# Patient Record
Sex: MALE | Race: WHITE | Employment: OTHER
[De-identification: names, ages, dates, MRNs, and addresses within clinical notes are randomized per-mention and may not be internally consistent; named-entity substitution may affect disease eponyms.]

---

## 2017-01-26 ENCOUNTER — OFFICE VISIT (OUTPATIENT)
Dept: PULMONOLOGY | Facility: CLINIC | Age: 62
End: 2017-01-26

## 2017-01-26 VITALS
OXYGEN SATURATION: 92 % | HEART RATE: 65 BPM | TEMPERATURE: 98 F | RESPIRATION RATE: 16 BRPM | DIASTOLIC BLOOD PRESSURE: 88 MMHG | SYSTOLIC BLOOD PRESSURE: 152 MMHG | HEIGHT: 70 IN | WEIGHT: 300 LBS | BODY MASS INDEX: 42.95 KG/M2

## 2017-01-26 DIAGNOSIS — E66.01 MORBID OBESITY WITH BMI OF 40.0-44.9, ADULT (HCC): ICD-10-CM

## 2017-01-26 DIAGNOSIS — G47.33 OSA ON CPAP: Primary | ICD-10-CM

## 2017-01-26 DIAGNOSIS — Z87.891 STOPPED SMOKING WITH GREATER THAN 40 PACK YEAR HISTORY: ICD-10-CM

## 2017-01-26 DIAGNOSIS — R53.81 PHYSICAL DECONDITIONING: ICD-10-CM

## 2017-01-26 DIAGNOSIS — Z99.89 OSA ON CPAP: Primary | ICD-10-CM

## 2017-01-26 DIAGNOSIS — J44.9 MODERATE COPD (CHRONIC OBSTRUCTIVE PULMONARY DISEASE) (HCC): ICD-10-CM

## 2017-01-26 PROCEDURE — 99213 OFFICE O/P EST LOW 20 MIN: CPT | Performed by: INTERNAL MEDICINE

## 2017-01-26 RX ORDER — ALBUTEROL SULFATE 90 UG/1
2 AEROSOL, METERED RESPIRATORY (INHALATION) EVERY 6 HOURS PRN
Qty: 1 INHALER | Refills: 11 | Status: SHIPPED | OUTPATIENT
Start: 2017-01-26 | End: 2020-08-24 | Stop reason: SDUPTHER

## 2017-01-26 ASSESSMENT — ENCOUNTER SYMPTOMS
SHORTNESS OF BREATH: 1
EYES NEGATIVE: 1
BACK PAIN: 1

## 2019-08-26 ENCOUNTER — OFFICE VISIT (OUTPATIENT)
Dept: PULMONOLOGY | Age: 64
End: 2019-08-26
Payer: MEDICARE

## 2019-08-26 VITALS
OXYGEN SATURATION: 95 % | HEART RATE: 66 BPM | DIASTOLIC BLOOD PRESSURE: 97 MMHG | BODY MASS INDEX: 45.1 KG/M2 | SYSTOLIC BLOOD PRESSURE: 164 MMHG | TEMPERATURE: 98.4 F | WEIGHT: 315 LBS | RESPIRATION RATE: 20 BRPM | HEIGHT: 70 IN

## 2019-08-26 DIAGNOSIS — G47.33 OSA ON CPAP: Primary | ICD-10-CM

## 2019-08-26 DIAGNOSIS — E66.01 MORBID OBESITY WITH BMI OF 40.0-44.9, ADULT (HCC): ICD-10-CM

## 2019-08-26 DIAGNOSIS — J44.9 MODERATE COPD (CHRONIC OBSTRUCTIVE PULMONARY DISEASE) (HCC): ICD-10-CM

## 2019-08-26 DIAGNOSIS — J44.9 COPD, MODERATE (HCC): ICD-10-CM

## 2019-08-26 DIAGNOSIS — I10 ESSENTIAL HYPERTENSION: ICD-10-CM

## 2019-08-26 DIAGNOSIS — Z99.89 OSA ON CPAP: Primary | ICD-10-CM

## 2019-08-26 DIAGNOSIS — J92.0 CALCIFIED PLEURAL PLAQUE DUE TO ASBESTOS EXPOSURE: ICD-10-CM

## 2019-08-26 DIAGNOSIS — J61 ASBESTOSIS (HCC): ICD-10-CM

## 2019-08-26 PROCEDURE — G8427 DOCREV CUR MEDS BY ELIG CLIN: HCPCS | Performed by: INTERNAL MEDICINE

## 2019-08-26 PROCEDURE — 3023F SPIROM DOC REV: CPT | Performed by: INTERNAL MEDICINE

## 2019-08-26 PROCEDURE — G8926 SPIRO NO PERF OR DOC: HCPCS | Performed by: INTERNAL MEDICINE

## 2019-08-26 PROCEDURE — 1036F TOBACCO NON-USER: CPT | Performed by: INTERNAL MEDICINE

## 2019-08-26 PROCEDURE — 99214 OFFICE O/P EST MOD 30 MIN: CPT | Performed by: INTERNAL MEDICINE

## 2019-08-26 PROCEDURE — G8417 CALC BMI ABV UP PARAM F/U: HCPCS | Performed by: INTERNAL MEDICINE

## 2019-08-26 PROCEDURE — 3017F COLORECTAL CA SCREEN DOC REV: CPT | Performed by: INTERNAL MEDICINE

## 2019-08-28 NOTE — PROGRESS NOTES
PULMONARY OP  PROGRESS NOTE      Patient:  Priscilla Pereira  YOB: 1955    MRN: [id number]     Acct:        Pt seen and Chart reviewed. . Priscilla Pereira is here in followup for   1. DESIREE on CPAP    2. COPD, moderate ([address]    3. Morbid obesity with BMI of 40.0-44.9, adult ([address]    4. Essential hypertension    5. Asbestosis ([address]    6. 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 Moderate COPD (chronic obstructive pulmonary disease) (HCC)          Pt has not been hospitalizedor been to er since last visit. Has been using meds as recommended.    Patient has been diagnosed with asbestosis since last evaluated by Dr. Alexandra Barcenas  Has some bilateral pedal edema  No shortness of breath or wheezing  No chest pain or pressure  Not much cough or sputum production  No hemoptysis  No orthopnea or PND  Has been using CPAP therapy for sleep apnea with improvement in the daytime sleepiness and fatigue and tiredness  Making an effort to lose weight    Subjective:  Review of Systems    Review of Systems -   General ROS: Completed and except as mentioned above were negative   Psychological ROS:  Completed and except as mentioned above were negative  Ophthalmic ROS:  Completed and except as mentioned above were negative  ENT ROS:  Completed and except as mentioned above were negative  Allergy and Immunology ROS:  Completed and except as mentioned above werenegative  Hematological and Lymphatic ROS:  Completed and except as mentioned above were negative  Endocrine ROS: Completed and except as mentioned above were negative  Respiratory ROS:  Completed and except asmentioned above were negative  Cardiovascular ROS:  Completed and except as mentioned above were negative  Gastrointestinal ROS: Completed and except as mentioned above were negative  Genito-Urinary ROS:  Completedand except as mentioned above were negative  Musculoskeletal ROS:  Completed and except as mentioned above were negative  Neurological ROS:  Completed and

## 2019-11-04 ENCOUNTER — TELEPHONE (OUTPATIENT)
Dept: PULMONOLOGY | Age: 64
End: 2019-11-04

## 2019-11-07 DIAGNOSIS — J44.9 COPD, MODERATE (HCC): Primary | ICD-10-CM

## 2019-11-18 ENCOUNTER — TELEPHONE (OUTPATIENT)
Dept: PULMONOLOGY | Age: 64
End: 2019-11-18

## 2019-11-18 DIAGNOSIS — J44.9 COPD, MODERATE (HCC): ICD-10-CM

## 2020-03-04 ENCOUNTER — TELEPHONE (OUTPATIENT)
Dept: VASCULAR SURGERY | Age: 65
End: 2020-03-04

## 2020-08-24 ENCOUNTER — OFFICE VISIT (OUTPATIENT)
Dept: PULMONOLOGY | Age: 65
End: 2020-08-24
Payer: MEDICARE

## 2020-08-24 VITALS
WEIGHT: 315 LBS | HEIGHT: 70 IN | SYSTOLIC BLOOD PRESSURE: 152 MMHG | DIASTOLIC BLOOD PRESSURE: 84 MMHG | OXYGEN SATURATION: 96 % | BODY MASS INDEX: 45.1 KG/M2 | TEMPERATURE: 97.5 F | HEART RATE: 73 BPM | RESPIRATION RATE: 16 BRPM

## 2020-08-24 PROCEDURE — 3023F SPIROM DOC REV: CPT | Performed by: INTERNAL MEDICINE

## 2020-08-24 PROCEDURE — G8427 DOCREV CUR MEDS BY ELIG CLIN: HCPCS | Performed by: INTERNAL MEDICINE

## 2020-08-24 PROCEDURE — 3017F COLORECTAL CA SCREEN DOC REV: CPT | Performed by: INTERNAL MEDICINE

## 2020-08-24 PROCEDURE — 99214 OFFICE O/P EST MOD 30 MIN: CPT | Performed by: INTERNAL MEDICINE

## 2020-08-24 PROCEDURE — G8926 SPIRO NO PERF OR DOC: HCPCS | Performed by: INTERNAL MEDICINE

## 2020-08-24 PROCEDURE — G8417 CALC BMI ABV UP PARAM F/U: HCPCS | Performed by: INTERNAL MEDICINE

## 2020-08-24 PROCEDURE — 1036F TOBACCO NON-USER: CPT | Performed by: INTERNAL MEDICINE

## 2020-08-24 RX ORDER — ALBUTEROL SULFATE 90 UG/1
2 AEROSOL, METERED RESPIRATORY (INHALATION) EVERY 6 HOURS PRN
Qty: 1 INHALER | Refills: 11 | Status: SHIPPED | OUTPATIENT
Start: 2020-08-24 | End: 2020-09-28 | Stop reason: ALTCHOICE

## 2020-08-24 NOTE — PATIENT INSTRUCTIONS
cough or sneeze. Use soap and water, and scrub for at least 20 seconds. If soap and water aren't available, use an alcohol-based hand . · Avoid touching your mouth, nose, and eyes. What can you do to avoid spreading the virus to others? To help avoid spreading the virus to others:  · Cover your mouth with a tissue when you cough or sneeze. Then throw the tissue in the trash. · Use a disinfectant to clean things that you touch often. · Wear a cloth face cover if you have to go to public areas. · Stay home if you are sick or have been exposed to the virus. Don't go to school, work, or public areas. And don't use public transportation, ride-shares, or taxis unless you have no choice. · If you are sick:  ? Leave your home only if you need to get medical care. But call the doctor's office first so they know you're coming. And wear a face cover. ? Wear the face cover whenever you're around other people. It can help stop the spread of the virus when you cough or sneeze. ? Clean and disinfect your home every day. Use household  and disinfectant wipes or sprays. Take special care to clean things that you grab with your hands. These include doorknobs, remote controls, phones, and handles on your refrigerator and microwave. And don't forget countertops, tabletops, bathrooms, and computer keyboards. When to call for help  [id number] anytime you think you may need emergency care. For example, call if:  · You have severe trouble breathing. (You can't talk at all.)  · You have constant chest pain or pressure. · You are severely dizzy or lightheaded. · You are confused or can't think clearly. · Your face and lips have a blue color. · You pass out (lose consciousness) or are very hard to wake up. Call your doctor now if you develop symptoms such as:  · Shortness of breath. · Fever. · Cough. If you need to get care, call ahead to the doctor's office for instructions before you go.  Make sure you wear a face cover to prevent exposing other people to the virus. Where can you get the latest information? The following health organizations are tracking and studying this virus. Their websites contain the most up-to-date information. Manjit Thomas also learn what to do if you think you may have been exposed to the virus. · U.S. Centers for Disease Control and Prevention (CDC): The CDC provides updated news about the disease and travel advice. The website also tells you how to prevent the spread of infection. www.cdc.gov  · World Health Organization [facility]): WHO offers information about the virus outbreaks. WHO also has travel advice. www.who.int  Current as of: May 8, 2020               Content Version: 12.5  © 2006-2020 Healthwise, Incorporated. Care instructions adapted under license by [facility] ([facility]). If you have questions about a medical condition or this instruction, always ask your healthcare professional. [address] any warranty or liability for your use of this information.

## 2020-08-31 NOTE — PROGRESS NOTES
PULMONARY OP  PROGRESS NOTE      Patient:  Meera Zuñiga  YOB: 1955    MRN: [id number]     Acct: [de-identified]       Pt seen and Chart reviewed. Mrs. Meera Zuñiga is here in followup for   1. COPD, moderate ([address]    2. Moderate COPD (chronic obstructive pulmonary disease) ([address]    3. DESIREE on CPAP    4. Morbid obesity with BMI of 40.0-44.9, adult ([address]    5. Essential hypertension    6. 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 Asbestosis ([facility]          Pt has not been hospitalizedor been to er since last visit. Has been using meds as recommended.    Patient has been diagnosed with asbestosis since last evaluated by Dr. Abhinav Hurd  Has some bilateral pedal edema  His bronchodilator has been switched to Spiriva secondary to insurance reasons  Patient does not like it  No shortness of breath or wheezing  No chest pain or pressure  Not much cough or sputum production  He does have some increased secretions  No hemoptysis  No orthopnea or PND  Has been using CPAP therapy for sleep apnea with improvement in the daytime sleepiness and fatigue and tiredness  Making an effort to lose weight  He does have some bilateral pedal edema    Subjective:  Review of Systems    Review of Systems -   General ROS: Completed and except as mentioned above were negative   Psychological ROS:  Completed and except as mentioned above were negative  Ophthalmic ROS:  Completed and except as mentioned above were negative  ENT ROS:  Completed and except as mentioned above were negative  Allergy and Immunology ROS:  Completed and except as mentioned above werenegative  Hematological and Lymphatic ROS:  Completed and except as mentioned above were negative  Endocrine ROS: Completed and except as mentioned above were negative  Respiratory ROS:  Completed and except asmentioned above were negative  Cardiovascular ROS:  Completed and except as mentioned above were negative  Gastrointestinal ROS: Completed and except as mentioned above were negative  Genito-Urinary ROS:  Completedand except as mentioned above were negative  Musculoskeletal ROS:  Completed and except as mentioned above were negative  Neurological ROS:  Completed and except as mentioned above were negative  Dermatological ROS:Completed and except as mentioned above were negative    SLEEP  No epistaxis or sore throat. No fatigue in am. No eds. Using PAP as recommended. No MVA. No edema. Allergies:  No Known Allergies    Medications:    Current Outpatient Medications:     Umeclidinium Bromide 62.5 MCG/INH AEPB, Inhale 1 puff into the lungs daily, Disp: 1 each, Rfl: 11    albuterol sulfate HFA (PROAIR HFA) 108 (90 Base) MCG/ACT inhaler, Inhale 2 puffs into the lungs every 6 hours as needed for Wheezing, Disp: 1 Inhaler, Rfl: 11    tiotropium (SPIRIVA HANDIHALER) 18 MCG inhalation capsule, Inhale 1 capsule into the lungs daily, Disp: 90 capsule, Rfl: 3    metFORMIN (GLUCOPHAGE) 1000 MG tablet, Take 1 tablet by mouth 2 times daily (with meals), Disp: 180 tablet, Rfl: 3      Objective:    Physical Exam:  Vitals: BP (!) 152/84   Pulse 73   Temp 97.5 °F (36.4 °C)   Resp 16   Ht 5' 10\" (1.778 m)   Wt (!) 320 lb (145.2 kg)   SpO2 96%   BMI 45.92 kg/m²   Last 3 weights: Wt Readings from Last 3 Encounters:   08/24/20 (!) 320 lb (145.2 kg)   10/18/19 (!) 330 lb 6.4 oz (149.9 kg)   08/26/19 (!) 320 lb (145.2 kg)     Body mass index is 45.92 kg/m². Physical Examination:   PHYSICAL EXAMINATION:  Vitals:    08/24/20 0842 08/24/20 0845   BP: (!) 162/80 (!) 152/84   Pulse: 71 73   Resp: 16    Temp: 97.5 °F (36.4 °C)    SpO2: 96%    Weight: (!) 320 lb (145.2 kg)    Height: 5' 10\" (1.778 m)        Physical Exam    Constitutional:This is a well developed, well nourished, Greater than 40 - Morbid Obesity / Extreme Obesity / Grade III 60y.o. year old male who is alert, oriented, cooperative andin no apparent distress. Head:normocephalic and atraumatic. EENT:   YAYA.   No conjunctival injections. Septum was midline, mucosa was without erythema, exudates or cobblestoning. No thrush was noted. Mallampati III (soft palate, base of uvula visible)  Neck: Supple without thyromegaly. No elevated JVP. Trachea was midline. Respiratory: Chest was symmetrical without dullness to percussion. Breath sounds bilaterally were clear to auscultation. There were no wheezes, rhonchi or rales. There isno intercostal retraction or use of accessory muscles. No egophony noted. Cardiovascular: Regular without murmur, clicks, gallops or rubs. Abdomen: Slightly rounded and soft without organomegaly. No rebound tenderness, rigidity or guarding wasappreciated. Lymphatic: Nolymphadenopathy. Musculoskeletal:Normal curvature of the spine. No gross muscle weakness. Extremities: Has bilateral lower extremity edema, no ulcerations, tenderness, varicosities or erythema. Muscle size, tone andstrength are normal.  No involuntary movements are noted. Skin:  Warm and dry. Good color, turgor and pigmentation. No lesions or scars. Neurological/Psychiatric: The patient's general behavior, level of consciousness, thought content and emotional status is normal.       Labs:  None            Assessment:    1. COPD, moderate ([address]    2. Moderate COPD (chronic obstructive pulmonary disease) ([address]    3. DESIREE on CPAP    4. Morbid obesity with BMI of 40.0-44.9, adult ([address]    5. Essential hypertension    6. 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 Asbestosis ([address]          PLAN:    Continue to monitor blood pressure and treat with medications  Refills were provided-Incruse and also albuterol to use as needed  This was ordered because the patient did not feel Spiriva was helping him  Patient was recommended to have prednisone and an antibiotic available for use during an exacerbation  Educated and clarified the medication use. Discusseduse, benefit, and side effects of prescribed medications. Barriers to medication compliance addressed. China Hester [place] received counseling on the following healthy behaviors: nutrition, exercise and medication adherence  Recommend flu vaccination in the fall annually. Recommendations given regarding pneumococcal vaccinations. Patient is up-to-date with vaccinations from pulmonary perspective. Maintain an active lifestyle. Continue smoking cessation. Patient was educated on how to use the respiratory medications. All the questions that the patient has had were answered to his satisfaction. Home O2 evaluation to be done. Supplemental oxygen was not needed. After reviewing the patient's smoking history and his age patient does not meet the criteria for lung cancer screening. CPAP to be used for at least 4 hours every night. Use humidifier if needed. Patient is using as recommended and benefiting from using thePAP machine. Weight loss was recommended and discussed. Recommended following good sleep hygiene instructions. Explained importance of compliance withtreatment of sleep apnea. Pt is not to drive if sleepy. Patient not a candidate for lung cancer screening  We'll see the patient back in 12 months or earlier if needed. Patient will call us if he is sick, so he can be seen sooner. Thank youfor having us involved in the care of your patient. Please call us if you have any questions or concerns.         Debbie Lovelace MD             8/30/2020, 9:28 PM

## 2021-08-16 ENCOUNTER — OFFICE VISIT (OUTPATIENT)
Dept: PULMONOLOGY | Age: 66
End: 2021-08-16
Payer: COMMERCIAL

## 2021-08-16 VITALS
RESPIRATION RATE: 20 BRPM | HEIGHT: 70 IN | DIASTOLIC BLOOD PRESSURE: 77 MMHG | WEIGHT: 315 LBS | HEART RATE: 83 BPM | OXYGEN SATURATION: 97 % | SYSTOLIC BLOOD PRESSURE: 141 MMHG | BODY MASS INDEX: 45.1 KG/M2 | TEMPERATURE: 98.9 F

## 2021-08-16 DIAGNOSIS — Z99.89 OSA ON CPAP: ICD-10-CM

## 2021-08-16 DIAGNOSIS — J44.9 COPD, MODERATE (HCC): Primary | ICD-10-CM

## 2021-08-16 DIAGNOSIS — I10 ESSENTIAL HYPERTENSION: ICD-10-CM

## 2021-08-16 DIAGNOSIS — E66.01 MORBID OBESITY WITH BMI OF 40.0-44.9, ADULT (HCC): ICD-10-CM

## 2021-08-16 DIAGNOSIS — G47.33 OSA ON CPAP: ICD-10-CM

## 2021-08-16 DIAGNOSIS — J92.0 CALCIFIED PLEURAL PLAQUE DUE TO ASBESTOS EXPOSURE: ICD-10-CM

## 2021-08-16 DIAGNOSIS — J44.9 MODERATE COPD (CHRONIC OBSTRUCTIVE PULMONARY DISEASE) (HCC): ICD-10-CM

## 2021-08-16 DIAGNOSIS — J61 ASBESTOSIS (HCC): ICD-10-CM

## 2021-08-16 PROCEDURE — 99214 OFFICE O/P EST MOD 30 MIN: CPT | Performed by: INTERNAL MEDICINE

## 2021-08-16 RX ORDER — UBIDECARENONE 75 MG
50 CAPSULE ORAL DAILY
COMMUNITY

## 2021-08-16 RX ORDER — M-VIT,TX,IRON,MINS/CALC/FOLIC 27MG-0.4MG
1 TABLET ORAL DAILY
COMMUNITY

## 2021-08-16 RX ORDER — ALBUTEROL SULFATE 90 UG/1
2 AEROSOL, METERED RESPIRATORY (INHALATION) EVERY 6 HOURS PRN
Qty: 1 INHALER | Refills: 3 | Status: SHIPPED | OUTPATIENT
Start: 2021-08-16 | End: 2022-10-05 | Stop reason: SDUPTHER

## 2021-08-17 ENCOUNTER — TELEPHONE (OUTPATIENT)
Dept: PULMONOLOGY | Age: 66
End: 2021-08-17

## 2021-08-17 NOTE — TELEPHONE ENCOUNTER
I called and discussed with the patient and let him know that the CPAP is working well for him with his AHI of 2.0 at a pressure of 11 cm of water.   Thank you

## 2021-08-17 NOTE — TELEPHONE ENCOUNTER
Patient called and indicated he still is only sleeping 2 hour intervals and then wakes up . Please review Download under Media and questioning if he needs pressure change. Please advise.

## 2021-08-18 NOTE — PROGRESS NOTES
PULMONARY OP  PROGRESS NOTE      Patient:  Shade Huang  YOB: 1955    MRN: [id number]     Acct: [de-identified]       Pt seen and Chart reviewed. Mr. Shade Huang is here in followup for   1. COPD, moderate ([address]    2. Moderate COPD (chronic obstructive pulmonary disease) ([address]    3. DESIREE on CPAP    4. Morbid obesity with BMI of 40.0-44.9, adult ([facility]    5. Essential hypertension    6. 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 Asbestosis ([facility] [facility]          Pt has not been hospitalizedor been to er since last visit. Has been using meds as recommended.   Has some bilateral pedal edema  No shortness of breath or wheezing  No chest pain or pressure  Not much cough or sputum production  He does have some increased secretions  No hemoptysis  No orthopnea or PND  Has been using CPAP therapy for sleep apnea with improvement in the daytime sleepiness and fatigue and tiredness  He was concerned if the machine is being effective or not and if the pressure need to be changed  Making an effort to lose weight  He does have some bilateral pedal edema    Subjective:  Review of Systems    Review of Systems -   General ROS: Completed and except as mentioned above were negative   Psychological ROS:  Completed and except as mentioned above were negative  Ophthalmic ROS:  Completed and except as mentioned above were negative  ENT ROS:  Completed and except as mentioned above were negative  Allergy and Immunology ROS:  Completed and except as mentioned above werenegative  Hematological and Lymphatic ROS:  Completed and except as mentioned above were negative  Endocrine ROS: Completed and except as mentioned above were negative  Respiratory ROS:  Completed and except asmentioned above were negative  Cardiovascular ROS:  Completed and except as mentioned above were negative  Gastrointestinal ROS: Completed and except as mentioned above were negative  Genito-Urinary ROS:  Completedand except as mentioned above were negative  Musculoskeletal ROS:  Completed and except as mentioned above were negative  Neurological ROS:  Completed and except as mentioned above were negative  Dermatological ROS:Completed and except as mentioned above were negative    SLEEP  No epistaxis or sore throat. No fatigue in am. No eds. Using PAP as recommended. No MVA. No edema. Allergies:  No Known Allergies    Medications:    Current Outpatient Medications:     vitamin B-12 (CYANOCOBALAMIN) 100 MCG tablet, Take 50 mcg by mouth daily, Disp: , Rfl:     Coenzyme Q10 (COQ-10) 100 MG CAPS, Take 1 tablet by mouth daily as needed, Disp: , Rfl:     Multiple Vitamins-Minerals (THERAPEUTIC MULTIVITAMIN-MINERALS) tablet, Take 1 tablet by mouth daily, Disp: , Rfl:     albuterol sulfate  (90 Base) MCG/ACT inhaler, Inhale 2 puffs into the lungs every 6 hours as needed for Wheezing or Shortness of Breath, Disp: 1 Inhaler, Rfl: 3    metFORMIN (GLUCOPHAGE) 1000 MG tablet, TAKE 1 TABLET BY MOUTH TWICE A DAY WITH MEALS, Disp: 180 tablet, Rfl: 3    INCRUSE ELLIPTA 62.5 MCG/INH AEPB, Inhale 62.5 mcg into the lungs daily, Disp: 3 each, Rfl: 3    losartan (COZAAR) 100 MG tablet, Take 1 tablet by mouth daily, Disp: 90 tablet, Rfl: 3    SITagliptin (JANUVIA) 100 MG tablet, Take 1 tablet by mouth daily, Disp: 90 tablet, Rfl: 3      Objective:    Physical Exam:  Vitals: BP (!) 141/77   Pulse 83   Temp 98.9 °F (37.2 °C) (Temporal)   Resp 20   Ht 5' 10\" (1.778 m)   Wt (!) 326 lb 11.2 oz (148.2 kg)   SpO2 97%   BMI 46.88 kg/m²   Last 3 weights: Wt Readings from Last 3 Encounters:   08/16/21 (!) 326 lb 11.2 oz (148.2 kg)   06/07/21 (!) 324 lb (147 kg)   05/03/21 (!) 324 lb (147 kg)     Body mass index is 46.88 kg/m².   Physical Examination:   PHYSICAL EXAMINATION:  Vitals:    08/16/21 0852 08/16/21 0859   BP: (!) 131/100 (!) 141/77   Site: Left Upper Arm    Position: Sitting    Cuff Size: Large Adult    Pulse: 87 83   Resp: 20    Temp: 98.9 °F (37.2 °C)    TempSrc: Temporal    SpO2: 97%    Weight: (!) 326 lb 11.2 oz (148.2 kg)    Height: 5' 10\" (1.778 m)        Physical Exam    Constitutional:This is a well developed, well nourished, Greater than 40 - Morbid Obesity / Extreme Obesity / Grade III 73y.o. year old male who is alert, oriented, cooperative andin no apparent distress. Head:normocephalic and atraumatic. EENT:   YAYA. No conjunctival injections. Septum was midline, mucosa was without erythema, exudates or cobblestoning. No thrush was noted. Mallampati III (soft palate, base of uvula visible)  Neck: Supple without thyromegaly. No elevated JVP. Trachea was midline. Respiratory: Chest was symmetrical without dullness to percussion. Breath sounds bilaterally were clear to auscultation. There were no wheezes, rhonchi or rales. There isno intercostal retraction or use of accessory muscles. No egophony noted. Cardiovascular: Regular without murmur, clicks, gallops or rubs. Abdomen: Slightly rounded and soft without organomegaly. No rebound tenderness, rigidity or guarding wasappreciated. Lymphatic: Nolymphadenopathy. Musculoskeletal:Normal curvature of the spine. No gross muscle weakness. Extremities: Has bilateral lower extremity edema, no ulcerations, tenderness, varicosities or erythema. Muscle size, tone andstrength are normal.  No involuntary movements are noted. Skin:  Warm and dry. Good color, turgor and pigmentation. No lesions or scars. Neurological/Psychiatric: The patient's general behavior, level of consciousness, thought content and emotional status is normal.       Labs: The compliance data was reviewed and showed good compliance of 93% for usage of more than 4 hours with a CPAP of 11 cmH2O with an AHI of 2      Assessment:    1. COPD, moderate ([address]    2. Moderate COPD (chronic obstructive pulmonary disease) ([address]    3. DESIREE on CPAP    4. Morbid obesity with BMI of 40.0-44.9, adult ([address]    5.  Essential hypertension 6. 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 Asbestosis ([address]          PLAN:    Continue to monitor blood pressure and treat with medications  Refills were provided-albuterol to use as needed  Patient was recommended to have prednisone and an antibiotic available for use during an exacerbation  Educated and clarified the medication use. Discusseduse, benefit, and side effects of prescribed medications. Barriers to medication compliance addressed. Frances Nedra [place] received counseling on the following healthy behaviors: nutrition, exercise and medication adherence  Recommend flu vaccination in the fall annually. Recommendations given regarding pneumococcal vaccinations. Patient had COVID-19 vaccination  Patient is up-to-date with vaccinations from pulmonary perspective. Maintain an active lifestyle. Continue smoking cessation. Patient was educated on how to use the respiratory medications. All the questions that the patient has had were answered to his satisfaction. Home O2 evaluation to be done. Supplemental oxygen was not needed. After reviewing the patient's smoking history and his age patient does not meet the criteria for lung cancer screening. CPAP to be used for at least 4 hours every night. Patient's compliance data was available to me after he left the visit so I called him and let him know that the CPAP machine is working well for him at the current pressure and no changes has to be made. He was happy with the explanation  Use humidifier if needed. Patient is using as recommended and benefiting from using the PAP machine. Weight loss was recommended and discussed. Recommended following good sleep hygiene instructions. Explained importance of compliance withtreatment of sleep apnea. Pt is not to drive if sleepy. Patient not a candidate for lung cancer screening  We'll see the patient back in 12 months or earlier if needed.   Patient will call us if he is sick, so he can be seen

## 2021-10-20 ENCOUNTER — HOSPITAL ENCOUNTER (OUTPATIENT)
Dept: ULTRASOUND IMAGING | Age: 66
Discharge: HOME | End: 2021-10-22
Payer: COMMERCIAL

## 2021-10-20 DIAGNOSIS — Z13.6 SCREENING FOR AAA (ABDOMINAL AORTIC ANEURYSM): ICD-10-CM

## 2021-11-10 ENCOUNTER — TELEPHONE (OUTPATIENT)
Dept: PHARMACY | Facility: CLINIC | Age: 66
End: 2021-11-10

## 2021-11-10 NOTE — TELEPHONE ENCOUNTER
Rosalio Salgado MD - would patient benefit from statin therapy?  - Per ADA/AHA guidelines, patient does meet criteria for moderate-high intensity statin- DM with high risk ASCVD 30%  - statin can also help to lower TG  - consider statin? Thank you,  Margaret Orr, PharmD, [address] Pharmacist  Department: [phone]  ==============================================================    POPULATION HEALTH CLINICAL PHARMACY: STATIN THERAPY REVIEW  Identified statin use in persons with diabetes care gap per medihumberto. ASSESSMENT    Lab Results   Component Value Date    LABA1C 6.9 (H) 04/29/2021    LABA1C 7.7 (H) 09/08/2020    LABA1C 7.4 (H) 09/18/2019     STATIN GAP IDENTIFIED    Lab Results   Component Value Date    CHOL 164 04/29/2021    TRIG 245 (H) 04/29/2021    HDL 36 (L) 04/29/2021    LDLCALC 79 04/29/2021     ALT   Date Value Ref Range Status   04/29/2021 26 0 - 40 U/L Final     Comment:     Performed at [facility]. [place] [facility]  [address] [facility] [address]       AST   Date Value Ref Range Status   04/29/2021 18 0 - 41 U/L Final     Comment:     Performed at [facility]. [facility]  [address] [facility] [address]         The 10-year ASCVD risk score (Ward Santa et al., 2013) is: 30.8%    Values used to calculate the score:      Age: 73 years      Sex: Male      Is Non- : No      Diabetic: Yes      Tobacco smoker: No      Systolic Blood Pressure: 994 mmHg      Is BP treated: Yes      HDL Cholesterol: 36 mg/dL      Total Cholesterol: 164 mg/dL     Hyperlipidemia Goal: Patient has a 10-yr ASCVD risk of >7.5% with DM and is therefore a candidate for high-intensity statin therapy based on updated guidelines. 2021 ADA Guidelines Age:   Edgar Jaimes  >/= 36years old:    o History of ASCVD or 10-year ASCVD risk > 20% - high-intensity statin is recommended.      PLAN  DM and high ASCVD risk, ldl 79 but - should consider moderate-high intensity statin.      Jose Segura, PharmD, [address] Pharmacist  Department: [phone]

## 2021-11-11 NOTE — TELEPHONE ENCOUNTER
LDL is 79    Triglycerides are elevated but there is no evidence that 1. Borderline triglycerides increase heart attack risk or 2. Statins decrease triglyceride levels.

## 2021-11-12 NOTE — TELEPHONE ENCOUNTER
Noted, thank you!     Jose Segura, PharmD, [address] Pharmacist  Department: [phone]  ============================================  For Pharmacy Admin Tracking Only     CPA in place:  No   Recommendation Provided To: Provider: 1 via Note to Provider   Intervention Detail: New Rx: 1, reason: Needs Additional Therapy   Gap Closed?: No    Intervention Accepted By: Provider: 0   Time Spent (min): 15

## 2022-08-15 ENCOUNTER — OFFICE VISIT (OUTPATIENT)
Dept: PULMONOLOGY | Age: 67
End: 2022-08-15
Payer: COMMERCIAL

## 2022-08-15 VITALS
DIASTOLIC BLOOD PRESSURE: 79 MMHG | HEART RATE: 72 BPM | BODY MASS INDEX: 44.39 KG/M2 | RESPIRATION RATE: 20 BRPM | OXYGEN SATURATION: 95 % | SYSTOLIC BLOOD PRESSURE: 162 MMHG | HEIGHT: 70 IN | WEIGHT: 310.1 LBS | TEMPERATURE: 98.1 F

## 2022-08-15 DIAGNOSIS — I10 ESSENTIAL HYPERTENSION: ICD-10-CM

## 2022-08-15 DIAGNOSIS — J92.0 CALCIFIED PLEURAL PLAQUE DUE TO ASBESTOS EXPOSURE: ICD-10-CM

## 2022-08-15 DIAGNOSIS — Z99.89 OSA ON CPAP: ICD-10-CM

## 2022-08-15 DIAGNOSIS — E66.01 MORBID OBESITY WITH BMI OF 40.0-44.9, ADULT (HCC): ICD-10-CM

## 2022-08-15 DIAGNOSIS — J44.9 COPD, MODERATE (HCC): Primary | ICD-10-CM

## 2022-08-15 DIAGNOSIS — Z87.891 PERSONAL HISTORY OF TOBACCO USE: ICD-10-CM

## 2022-08-15 DIAGNOSIS — G47.33 OSA ON CPAP: ICD-10-CM

## 2022-08-15 DIAGNOSIS — J61 ASBESTOSIS (HCC): ICD-10-CM

## 2022-08-15 PROCEDURE — 99214 OFFICE O/P EST MOD 30 MIN: CPT | Performed by: INTERNAL MEDICINE

## 2022-08-15 PROCEDURE — 1123F ACP DISCUSS/DSCN MKR DOCD: CPT | Performed by: INTERNAL MEDICINE

## 2022-08-15 RX ORDER — UMECLIDINIUM 62.5 UG/1
62.5 AEROSOL, POWDER ORAL DAILY
Qty: 3 EACH | Refills: 3 | Status: SHIPPED | OUTPATIENT
Start: 2022-08-15

## 2022-08-15 NOTE — PROGRESS NOTES
PULMONARY OP  PROGRESS NOTE      Patient:  Ivana Ganser  YOB: 1955    MRN: [id number]     Acct: [de-identified]       Pt seen and Chart reviewed. Mr. Ivana Ganser is here in followup for   1. COPD, moderate ([address]    2. DESIREE on CPAP    3. Morbid obesity with BMI of 40.0-44.9, adult ([address]    4. Essential hypertension    5. 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 Asbestosis ([address]          Pt has not been hospitalized or been to er since last visit. Has been using meds as recommended.   Has some bilateral pedal edema  No shortness of breath or wheezing  No chest pain or pressure  Not much cough or sputum production  No hemoptysis  No orthopnea or PND  Has been using CPAP therapy for sleep apnea with improvement in the daytime sleepiness and fatigue and tiredness  Making an effort to lose weight  He lost about 23 pounds recently  He does have some bilateral pedal edema    Subjective:  Review of Systems    Review of Systems -   General ROS: Completed and except as mentioned above were negative   Psychological ROS:  Completed and except as mentioned above were negative  Ophthalmic ROS:  Completed and except as mentioned above were negative  ENT ROS:  Completed and except as mentioned above were negative  Allergy and Immunology ROS:  Completed and except as mentioned above werenegative  Hematological and Lymphatic ROS:  Completed and except as mentioned above were negative  Endocrine ROS: Completed and except as mentioned above were negative  Respiratory ROS:  Completed and except asmentioned above were negative  Cardiovascular ROS:  Completed and except as mentioned above were negative  Gastrointestinal ROS: Completed and except as mentioned above were negative  Genito-Urinary ROS:  Completedand except as mentioned above were negative  Musculoskeletal ROS:  Completed and except as mentioned above were negative  Neurological ROS:  Completed and except as mentioned above were negative  Dermatological ROS:Completed and except as mentioned above were negative    SLEEP  No epistaxis or sore throat. No fatigue in am. No eds. Using PAP as recommended. No MVA. No edema. Allergies: Allergies   Allergen Reactions    Seasonal        Medications:    Current Outpatient Medications: SITagliptin (JANUVIA) 100 MG tablet, Take 1 tablet by mouth daily, Disp: 90 tablet, Rfl: 3    losartan (COZAAR) 100 MG tablet, Take 1 tablet by mouth daily, Disp: 90 tablet, Rfl: 3    INCRUSE ELLIPTA 62.5 MCG/INH AEPB, Inhale 62.5 mcg into the lungs daily, Disp: 3 each, Rfl: 3    metFORMIN (GLUCOPHAGE) 1000 MG tablet, Take 1 tablet by mouth 2 times daily (with meals), Disp: 180 tablet, Rfl: 3    vitamin B-12 (CYANOCOBALAMIN) 100 MCG tablet, Take 50 mcg by mouth daily, Disp: , Rfl:     Multiple Vitamins-Minerals (THERAPEUTIC MULTIVITAMIN-MINERALS) tablet, Take 1 tablet by mouth daily, Disp: , Rfl:     albuterol sulfate  (90 Base) MCG/ACT inhaler, Inhale 2 puffs into the lungs every 6 hours as needed for Wheezing or Shortness of Breath, Disp: 1 Inhaler, Rfl: 3    Semaglutide,0.25 or 0.5MG/DOS, (OZEMPIC, 0.25 OR 0.5 MG/DOSE,) 2 MG/1.5ML SOPN, Inject 0.25 mg into the skin once a week (Patient not taking: Reported on 8/15/2022), Disp: 1.5 mL, Rfl: 4    Coenzyme Q10 (COQ-10) 100 MG CAPS, Take 1 tablet by mouth daily as needed (Patient not taking: No sig reported), Disp: , Rfl:       Objective:    Physical Exam:  Vitals: BP (!) 162/79   Pulse 72   Temp 98.1 °F (36.7 °C)   Resp 20   Ht 5' 10\" (1.778 m)   Wt (!) 310 lb 1.6 oz (140.7 kg)   SpO2 95%   BMI 44.49 kg/m²   Last 3 weights: Wt Readings from Last 3 Encounters:   08/15/22 (!) 310 lb 1.6 oz (140.7 kg)   07/11/22 (!) 312 lb 9.6 oz (141.8 kg)   04/11/22 (!) 318 lb (144.2 kg)     Body mass index is 44.49 kg/m².   Physical Examination:   PHYSICAL EXAMINATION:  Vitals:    08/15/22 0927 08/15/22 0930   BP: (!) 158/76 (!) 162/79   Pulse: 66 72   Resp: 20    Temp: 98.1 °F (36.7 °C)    SpO2: 95%    Weight: (!) 310 lb 1.6 oz (140.7 kg)    Height: 5' 10\" (1.778 m)        Physical Exam    Constitutional:This is a well developed, well nourished, Greater than 40 - Morbid Obesity / Extreme Obesity / Grade III 78y.o. year old male who is alert, oriented, cooperative andin no apparent distress. Head:normocephalic and atraumatic. EENT:   YAYA. No conjunctival injections. Septum was midline, mucosa was without erythema, exudates or cobblestoning. No thrush was noted. Mallampati III (soft palate, base of uvula visible)  Neck: Supple without thyromegaly. No elevated JVP. Trachea was midline. Respiratory: Chest was symmetrical without dullness to percussion. Breath sounds bilaterally were clear to auscultation. There were no wheezes, rhonchi or rales. There isno intercostal retraction or use of accessory muscles. No egophony noted. Cardiovascular: Regular without murmur, clicks, gallops or rubs. Abdomen: Slightly rounded and soft without organomegaly. No rebound tenderness, rigidity or guarding wasappreciated. Lymphatic: Nolymphadenopathy. Musculoskeletal:Normal curvature of the spine. No gross muscle weakness. Extremities: Has bilateral lower extremity edema, no ulcerations, tenderness, varicosities or erythema. Muscle size, tone andstrength are normal.  No involuntary movements are noted. Skin:  Warm and dry. Good color, turgor and pigmentation. No lesions or scars. Neurological/Psychiatric: The patient's general behavior, level of consciousness, thought content and emotional status is normal.       Labs: The compliance data was not available for my review at the time of this visit      Assessment:    1. COPD, moderate ([address]    2. DESIREE on CPAP    3. Morbid obesity with BMI of 40.0-44.9, adult ([address]    4. Essential hypertension    5. 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  Asbestosis ([address]          PLAN:    Continue to monitor blood pressure and treat with medications  Refills were provided-Incruse for 1 year  Patient was recommended to have prednisone and an antibiotic available for use during an exacerbation  Educated and clarified the medication use. Discusseduse, benefit, and side effects of prescribed medications. Barriers to medication compliance addressed. Jerson Melodie Christine received counseling on the following healthy behaviors: nutrition, exercise and medication adherence  Recommend flu vaccination in the fall annually. Recommendations given regarding pneumococcal vaccinations. Patient had COVID-19 vaccination  Patient is up-to-date with vaccinations from pulmonary perspective. Maintain an active lifestyle. Continue smoking cessation. Patient was educated on how to use the respiratory medications. All the questions that the patient has had were answered to his satisfaction. Home O2 evaluation to be done. Supplemental oxygen was not needed. After reviewing the patient's smoking history and his age patient does not meet the criteria for lung cancer screening as he smoked for less than 10 pack years. CPAP to be used for at least 4 hours every night. Use humidifier if needed. Patient is using as recommended and benefiting from using the PAP machine. Weight loss was recommended and discussed. Recommended following good sleep hygiene instructions. Explained importance of compliance withtreatment of sleep apnea. Pt is not to drive if sleepy. Patient not a candidate for lung cancer screening as he has less than 20 pack years history of smoking  We'll see the patient back in 12 months or earlier if needed. Patient will call us if he is sick, so he can be seen sooner. Thank youfor having us involved in the care of your patient. Please call us if you have any questions or concerns.         Tatiana Case MD, MD             8/15/2022, 9:33 AM

## 2022-08-15 NOTE — PATIENT INSTRUCTIONS
SURVEY:    You may be receiving a survey from "[employer]" regarding your visit today. Please complete the survey to enable us to provide the highest quality of care to you and your family. If you cannot score us a very good on any question, please call the office to discuss how we could have made your experience a very good one. Thank you. Please recheck your blood pressure when you go home and make sure you take your prescribed medication if applicable . Please follow up with your PCP or ER if needed.

## 2022-10-03 DIAGNOSIS — J44.9 COPD, MODERATE (HCC): ICD-10-CM

## 2022-10-05 DIAGNOSIS — J44.9 COPD, MODERATE (HCC): ICD-10-CM

## 2022-10-05 RX ORDER — ALBUTEROL SULFATE 90 UG/1
AEROSOL, METERED RESPIRATORY (INHALATION)
Qty: 18 G | Refills: 3 | Status: SHIPPED | OUTPATIENT
Start: 2022-10-05

## 2022-10-05 RX ORDER — ALBUTEROL SULFATE 90 UG/1
2 AEROSOL, METERED RESPIRATORY (INHALATION) EVERY 6 HOURS PRN
Qty: 1 EACH | Refills: 3 | Status: SHIPPED | OUTPATIENT
Start: 2022-10-05

## 2023-04-06 ENCOUNTER — TELEPHONE (OUTPATIENT)
Dept: PULMONOLOGY | Age: 68
End: 2023-04-06

## 2023-08-21 ENCOUNTER — OFFICE VISIT (OUTPATIENT)
Dept: PULMONOLOGY | Age: 68
End: 2023-08-21
Payer: COMMERCIAL

## 2023-08-21 VITALS
DIASTOLIC BLOOD PRESSURE: 64 MMHG | HEART RATE: 76 BPM | TEMPERATURE: 97.4 F | RESPIRATION RATE: 16 BRPM | SYSTOLIC BLOOD PRESSURE: 141 MMHG | BODY MASS INDEX: 45.1 KG/M2 | HEIGHT: 70 IN | OXYGEN SATURATION: 94 % | WEIGHT: 315 LBS

## 2023-08-21 DIAGNOSIS — E66.01 MORBID OBESITY WITH BMI OF 40.0-44.9, ADULT (HCC): ICD-10-CM

## 2023-08-21 DIAGNOSIS — J92.0 CALCIFIED PLEURAL PLAQUE DUE TO ASBESTOS EXPOSURE: ICD-10-CM

## 2023-08-21 DIAGNOSIS — G47.33 OSA ON CPAP: ICD-10-CM

## 2023-08-21 DIAGNOSIS — Z87.891 PERSONAL HISTORY OF TOBACCO USE: ICD-10-CM

## 2023-08-21 DIAGNOSIS — I10 ESSENTIAL HYPERTENSION: ICD-10-CM

## 2023-08-21 DIAGNOSIS — J61 ASBESTOSIS (HCC): ICD-10-CM

## 2023-08-21 DIAGNOSIS — Z99.89 OSA ON CPAP: ICD-10-CM

## 2023-08-21 DIAGNOSIS — J44.9 COPD, MODERATE (HCC): Primary | ICD-10-CM

## 2023-08-21 PROCEDURE — 3077F SYST BP >= 140 MM HG: CPT | Performed by: INTERNAL MEDICINE

## 2023-08-21 PROCEDURE — 1123F ACP DISCUSS/DSCN MKR DOCD: CPT | Performed by: INTERNAL MEDICINE

## 2023-08-21 PROCEDURE — 3078F DIAST BP <80 MM HG: CPT | Performed by: INTERNAL MEDICINE

## 2023-08-21 PROCEDURE — 99214 OFFICE O/P EST MOD 30 MIN: CPT | Performed by: INTERNAL MEDICINE

## 2023-08-21 RX ORDER — UMECLIDINIUM BROMIDE AND VILANTEROL TRIFENATATE 62.5; 25 UG/1; UG/1
1 POWDER RESPIRATORY (INHALATION) DAILY
Qty: 3 EACH | Refills: 3 | Status: SHIPPED | OUTPATIENT
Start: 2023-08-21

## 2023-08-21 NOTE — PROGRESS NOTES
scars.  Neurological/Psychiatric: The patient's general behavior, level of consciousness, thought content and emotional status is normal.       Labs: The compliance data was not available for my review at the time of this visit      Assessment:    1. COPD, moderate ([address])    2. DESIREE on CPAP    3. Morbid obesity with BMI of 40.0-44.9, adult ([address])    4. Essential hypertension    5. 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 Asbestosis ([address])    7. Personal history of tobacco use          PLAN:    Ordered a CPAP titration study due to patient feeling daytime sleepiness and fatigue and tiredness despite using the CPAP  Refills were provided-Anoro  Patient was recommended to have prednisone and an antibiotic available for use during an exacerbation  Educated and clarified the medication use. Patient hold off on include after he starts on Anoro  Discusseduse, benefit, and side effects of prescribed medications. Barriers to medication compliance addressed. Jeb Deleon Purcell received counseling on the following healthy behaviors: nutrition, exercise and medication adherence  Recommend flu vaccination in the fall annually. Recommendations given regarding pneumococcal vaccinations. Patient had COVID-19 vaccination. Recommended Bi Valent booster  Patient is up-to-date with vaccinations from pulmonary perspective. Maintain an active lifestyle. Continue smoking cessation. Patient was educated on how to use the respiratory medications. All the questions that the patient has had were answered to his satisfaction. Home O2 evaluation to be done. Supplemental oxygen was not needed. After reviewing the patient's smoking history and his age patient does not meet the criteria for lung cancer screening as he smoked for less than 10 pack years. CPAP to be used for at least 4 hours every night. Use humidifier if needed. Patient is using as recommended and benefiting from using the PAP machine.   Weight loss was

## 2023-08-21 NOTE — PATIENT INSTRUCTIONS
SURVEY:    You may be receiving a survey from [employer] regarding your visit today. Please complete the survey to enable us to provide the highest quality of care to you and your family. If you cannot score us a very good on any question, please call the office to discuss how we could have made your experience a very good one. Thank you. Please recheck your blood pressure when you go home and make sure you take your prescribed medication if applicable . Please follow up with your PCP or ER if needed.

## 2023-11-08 ENCOUNTER — TELEPHONE (OUTPATIENT)
Dept: SURGERY | Age: 68
End: 2023-11-08

## 2023-11-08 DIAGNOSIS — I10 HYPERTENSION, UNSPECIFIED TYPE: Chronic | ICD-10-CM

## 2023-11-08 DIAGNOSIS — Z01.818 PRE-OP TESTING: Primary | ICD-10-CM

## 2023-11-29 ENCOUNTER — TELEPHONE (OUTPATIENT)
Dept: SURGERY | Age: 68
End: 2023-11-29

## 2023-11-29 NOTE — TELEPHONE ENCOUNTER
Patient called office stating he reviewed prep instructions for colonoscopy on 3/29/24 and called PCP because he does not feel he is able to go 2 days without eating and is diabetic. PCP advised pt to call our office for less restrictive preps. Patient advised that per provider's prep he is to do clear liquids the day prior and can have bone/chicken broth and jello. Patient stated [place] [facility] do you expect a 320 lb man to go without eating? \" And reported feeling he may cancel procedure. Patient stated he would think about it.

## 2023-12-13 ENCOUNTER — TELEPHONE (OUTPATIENT)
Dept: SURGERY | Age: 68
End: 2023-12-13

## 2023-12-13 NOTE — TELEPHONE ENCOUNTER
Patient called office stating he can not see PCP to discuss colonoscopy prep concerns until April and requested to cancel procedure. Surgery Sheet faxed to Surgery for cancellation.

## 2024-07-15 ENCOUNTER — OFFICE VISIT (OUTPATIENT)
Dept: PULMONOLOGY | Age: 69
End: 2024-07-15
Payer: COMMERCIAL

## 2024-07-15 VITALS
HEART RATE: 64 BPM | DIASTOLIC BLOOD PRESSURE: 65 MMHG | BODY MASS INDEX: 44.27 KG/M2 | WEIGHT: 309.2 LBS | RESPIRATION RATE: 20 BRPM | SYSTOLIC BLOOD PRESSURE: 134 MMHG | OXYGEN SATURATION: 98 % | HEIGHT: 70 IN | TEMPERATURE: 96.6 F

## 2024-07-15 DIAGNOSIS — G47.33 OSA ON CPAP: ICD-10-CM

## 2024-07-15 DIAGNOSIS — E66.01 MORBID OBESITY WITH BMI OF 40.0-44.9, ADULT (HCC): ICD-10-CM

## 2024-07-15 DIAGNOSIS — I10 ESSENTIAL HYPERTENSION: ICD-10-CM

## 2024-07-15 DIAGNOSIS — J44.9 COPD, MODERATE (HCC): Primary | ICD-10-CM

## 2024-07-15 DIAGNOSIS — Z87.891 PERSONAL HISTORY OF TOBACCO USE: ICD-10-CM

## 2024-07-15 DIAGNOSIS — J92.0 CALCIFIED PLEURAL PLAQUE DUE TO ASBESTOS EXPOSURE: ICD-10-CM

## 2024-07-15 DIAGNOSIS — J61 ASBESTOSIS (HCC): ICD-10-CM

## 2024-07-15 PROCEDURE — 1123F ACP DISCUSS/DSCN MKR DOCD: CPT | Performed by: INTERNAL MEDICINE

## 2024-07-15 PROCEDURE — 3075F SYST BP GE 130 - 139MM HG: CPT | Performed by: INTERNAL MEDICINE

## 2024-07-15 PROCEDURE — 3078F DIAST BP <80 MM HG: CPT | Performed by: INTERNAL MEDICINE

## 2024-07-15 PROCEDURE — 99214 OFFICE O/P EST MOD 30 MIN: CPT | Performed by: INTERNAL MEDICINE

## 2024-07-15 RX ORDER — UMECLIDINIUM BROMIDE AND VILANTEROL TRIFENATATE 62.5; 25 UG/1; UG/1
1 POWDER RESPIRATORY (INHALATION) DAILY
Qty: 3 EACH | Refills: 3 | Status: SHIPPED | OUTPATIENT
Start: 2024-07-15

## 2024-07-15 RX ORDER — LEVALBUTEROL TARTRATE 45 UG/1
1 AEROSOL, METERED ORAL EVERY 4 HOURS PRN
Qty: 1 EACH | Refills: 5 | Status: SHIPPED | OUTPATIENT
Start: 2024-07-15 | End: 2025-07-15

## 2024-07-15 NOTE — PATIENT INSTRUCTIONS
SURVEY:    You may be receiving a survey from [employer] regarding your visit today.    Please complete the survey to enable us to provide the highest quality of care to you and your family.    If you cannot score us a very good on any question, please call the office to discuss how we could have made your experience a very good one.    Thank you.      COPD Zones education sheet was provided at discharge.  It is important to know your zones to manage your disease.  Review your zone sheet daily to determine which zone you are in each day.

## 2024-07-15 NOTE — PROGRESS NOTES
Wt (!) 140.3 kg (309 lb 3.2 oz)   SpO2 98%   BMI 44.37 kg/m²   Last 3 weights:  Wt Readings from Last 3 Encounters:   07/15/24 (!) 140.3 kg (309 lb 3.2 oz)   04/30/24 (!) 148.1 kg (326 lb 9.6 oz)   01/10/24 (!) 146.1 kg (322 lb 3.2 oz)     Body mass index is 44.37 kg/m².  Physical Examination:   PHYSICAL EXAMINATION:  Vitals:    07/15/24 0831   BP: 134/65   Pulse: 64   Resp: 20   Temp: (!) 96.6 °F (35.9 °C)   SpO2: 98%   Weight: (!) 140.3 kg (309 lb 3.2 oz)   Height: 1.778 m (5' 10\")       Physical Exam    Constitutional:This is a well developed, well nourished, Greater than 40 - Morbid Obesity / Extreme Obesity / Grade III 69 y.o. year old male who is alert, oriented, cooperative andin no apparent distress.    Head:normocephalic and atraumatic.  EENT:   YAYA.  No conjunctival injections.  Septum was midline, mucosa was without erythema, exudates or cobblestoning.  No thrush was noted. Mallampati III (soft palate, base of uvula visible)  Neck: Supple without thyromegaly. No elevated JVP. Trachea was midline.  Respiratory: Chest was symmetrical without dullness to percussion.  Breath sounds bilaterally were clear to auscultation. There were no wheezes, rhonchi or rales. There isno intercostal retraction or use of accessory muscles.  Cardiovascular: Regular without murmur, clicks, gallops or rubs.   Abdomen: Slightly rounded and soft without organomegaly. No rebound tenderness, rigidity or guarding wasappreciated.    Lymphatic: No lymphadenopathy.  Extremities: Has bilateral lower extremity edema, no ulcerations, tenderness, varicosities or erythema.  No involuntary movements are noted.   Skin:  Warm and dry.  Good color, turgor and pigmentation. No lesions or scars.  Neurological/Psychiatric: The patient's general behavior, level of consciousness, thought content and emotional status is normal.       Labs:  The compliance data was not available for my review at the time of this visit      Assessment:    1. COPD,

## 2024-07-17 ENCOUNTER — TELEPHONE (OUTPATIENT)
Dept: PULMONOLOGY | Age: 69
End: 2024-07-17

## 2024-07-17 RX ORDER — ALBUTEROL SULFATE 90 UG/1
2 AEROSOL, METERED RESPIRATORY (INHALATION) 4 TIMES DAILY PRN
Qty: 18 G | Refills: 5 | Status: SHIPPED | OUTPATIENT
Start: 2024-07-17

## 2024-07-17 NOTE — TELEPHONE ENCOUNTER
Insurance noted that xopenex neb. Solution is not approved and the patient needs to try Ventolin first.  Please advise.

## 2024-07-18 NOTE — TELEPHONE ENCOUNTER
Left voicemail message that Xopenex not covered by insurance and albuterol prescription was sent to [facility].  To call office with any questions/concerns.

## 2024-08-17 ENCOUNTER — HOSPITAL ENCOUNTER (OUTPATIENT)
Dept: SLEEP CENTER | Age: 69
Discharge: HOME | End: 2024-08-17

## 2024-08-17 VITALS — HEIGHT: 70 IN | WEIGHT: 309 LBS | BODY MASS INDEX: 44.24 KG/M2

## 2024-08-17 DIAGNOSIS — G47.33 OSA ON CPAP: ICD-10-CM

## 2024-08-17 ASSESSMENT — SLEEP AND FATIGUE QUESTIONNAIRES
DO YOU SNORE: NO
HOW LIKELY ARE YOU TO NOD OFF OR FALL ASLEEP IN A CAR, WHILE STOPPED FOR A FEW MINUTES IN TRAFFIC: WOULD NEVER DOZE
HOW LIKELY ARE YOU TO NOD OFF OR FALL ASLEEP WHEN YOU ARE A PASSENGER IN A CAR FOR AN HOUR WITHOUT A BREAK: WOULD NEVER DOZE
ESS TOTAL SCORE: 6
USUAL AMOUNT OF TIME TO FALL ASLEEP (MIN): 15
DO YOU HAVE HIGH BLOOD PRESSURE: YES
WHAT TIME DO YOU USUALLY WAKE UP: 18000
NUMBER OF TIMES YOU WAKE PER NIGHT: 1
HOW LIKELY ARE YOU TO NOD OFF OR FALL ASLEEP WHILE SITTING AND READING: WOULD NEVER DOZE
HOW LIKELY ARE YOU TO NOD OFF OR FALL ASLEEP WHILE SITTING INACTIVE IN A PUBLIC PLACE: WOULD NEVER DOZE
HOW MANY NAPS DO YOU TAKE PER WEEK: 7
HOW LIKELY ARE YOU TO NOD OFF OR FALL ASLEEP WHILE LYING DOWN TO REST IN THE AFTERNOON WHEN CIRCUMSTANCES PERMIT: HIGH CHANCE OF DOZING
NORMAL AMOUNT OF SLEEP PER NIGHT: 5
HOW LIKELY ARE YOU TO NOD OFF OR FALL ASLEEP WHILE SITTING QUIETLY AFTER LUNCH WITHOUT ALCOHOL: MODERATE CHANCE OF DOZING
HOW LIKELY ARE YOU TO NOD OFF OR FALL ASLEEP WHILE WATCHING TV: SLIGHT CHANCE OF DOZING
WHAT TIME DO YOU USUALLY GO TO BED: 43200
HOW LIKELY ARE YOU TO NOD OFF OR FALL ASLEEP WHILE SITTING AND TALKING TO SOMEONE: WOULD NEVER DOZE

## 2024-08-22 LAB — STATUS: NORMAL

## 2024-08-23 DIAGNOSIS — G47.33 OSA ON CPAP: Primary | ICD-10-CM

## 2024-12-30 ENCOUNTER — TELEPHONE (OUTPATIENT)
Dept: PULMONOLOGY | Age: 69
End: 2024-12-30

## 2024-12-30 NOTE — TELEPHONE ENCOUNTER
Insurance will no longer cover Anoro Ellipta and they did not indicate a covered inhaler.  Please advise.  Next appt. 7/25.

## 2024-12-31 NOTE — TELEPHONE ENCOUNTER
Johnnie called back and said that he received Sandra's message that the Anoro Ellipta is not being covered by his insurance. He said he is okay with any inhaler to be sent to [facility]. I called Jonathan back and left a voicemail. I asked that he please contact his insurance and ask them what formulary alternative inhaler they will cover in place of the Anoro. To please call us back once he talks to them.

## 2024-12-31 NOTE — TELEPHONE ENCOUNTER
Johnnie called back and said he feels like he is getting the run around from his insurance. He said he thinks he will just go without an inhaler. I again told him it would be best to call the insurance and ask for a formulary alternative for Anoro Ellipta. They should be able to tell him which inhalers they will cover. He said he will call them one more time.

## 2025-01-03 NOTE — TELEPHONE ENCOUNTER
Left voicemail message informing that Rx was sent and to call office with any questions/concerns.

## 2025-01-03 NOTE — TELEPHONE ENCOUNTER
Jonathan called back and stated he is able to get help with the Bevespi prescription.     If that is agreeable, he would like it sent to [facility].    Thank you

## 2025-02-10 ENCOUNTER — TELEPHONE (OUTPATIENT)
Dept: PULMONOLOGY | Age: 70
End: 2025-02-10

## 2025-02-10 NOTE — TELEPHONE ENCOUNTER
Pt called with concern that he is \"barely sleeping at night\" and doesn't feel as if the settings on his CPAP machine are right. I requested for pt to contact his DME company and have them fax us a 90 day compliance report for Dr. Peña to review. He verbalized understanding.

## 2025-02-12 NOTE — TELEPHONE ENCOUNTER
Left a voicemail for patient regarding Dr Peña's response and to call the office with any other concerns or questions.

## 2025-02-13 ENCOUNTER — HOSPITAL ENCOUNTER (EMERGENCY)
Age: 70
Discharge: HOME | End: 2025-02-13
Payer: COMMERCIAL

## 2025-02-13 VITALS
HEART RATE: 92 BPM | BODY MASS INDEX: 47.74 KG/M2 | WEIGHT: 315 LBS | SYSTOLIC BLOOD PRESSURE: 153 MMHG | TEMPERATURE: 98.3 F | HEIGHT: 68 IN | RESPIRATION RATE: 18 BRPM | DIASTOLIC BLOOD PRESSURE: 95 MMHG | OXYGEN SATURATION: 96 %

## 2025-02-13 DIAGNOSIS — S61.412A LACERATION OF LEFT HAND WITHOUT FOREIGN BODY, INITIAL ENCOUNTER: Primary | ICD-10-CM

## 2025-02-13 PROCEDURE — 99283 EMERGENCY DEPT VISIT LOW MDM: CPT

## 2025-02-13 ASSESSMENT — LIFESTYLE VARIABLES
HOW OFTEN DO YOU HAVE A DRINK CONTAINING ALCOHOL: NEVER
HOW MANY STANDARD DRINKS CONTAINING ALCOHOL DO YOU HAVE ON A TYPICAL DAY: PATIENT DOES NOT DRINK

## 2025-02-13 ASSESSMENT — ENCOUNTER SYMPTOMS
COUGH: 0
SHORTNESS OF BREATH: 0

## 2025-02-13 NOTE — ED PROVIDER NOTES
General: He is not in acute distress.     Appearance: He is obese. He is not ill-appearing.   Pulmonary:      Effort: Pulmonary effort is normal.   Skin:     General: Skin is warm and dry.      Findings: Wound present.      Comments: A 3 cm C-shaped laceration is present to the lateral surface of the palm of the left hand.  No active bleeding.  Wound borders dry.  No obvious contamination or deep tissue injury.  Range of motion of digits intact.  Hand neurovascular intact.   Neurological:      General: No focal deficit present.      Mental Status: He is alert. Mental status is at baseline.   Psychiatric:         Mood and Affect: Mood normal.         Behavior: Behavior normal.         DIAGNOSTIC RESULTS       Interpretation per the Radiologist below, if available at the time of this note:    No orders to display         LABS:  Labs Reviewed - No data to display    All other labs were within normal range or not returned as of this dictation.    EMERGENCY DEPARTMENT COURSE and DIFFERENTIAL DIAGNOSIS/MDM:     Patient seen and evaluated in the emergency department with a laceration to the palm of the hand.  Was 20 hours old.  At this point would not benefit from suture closure.  It was thoroughly cleansed and Steri-Strips placed for reapproximation.  A bulky dressing was placed.  Patient is up-to-date on tetanus.  Plan is discharge home.           REASSESSMENT            CRITICAL CARE TIME     Total Critical Care time was 0 minutes, excluding separately reportable procedures.  There was a high probability of clinically significant/life threatening deterioration in the patient's condition which required my urgent intervention.      PROCEDURES:  Unless otherwise noted below, none     Lac Repair    Date/Time: 2/13/2025 5:06 PM    Performed by: Bernadette Russell PA-C  Authorized by: Bernadette Russell PA-C    Consent:     Consent obtained:  Verbal  Universal protocol:     Patient identity confirmed:  Verbally with